# Patient Record
Sex: MALE | Race: WHITE | NOT HISPANIC OR LATINO | Employment: FULL TIME | URBAN - METROPOLITAN AREA
[De-identification: names, ages, dates, MRNs, and addresses within clinical notes are randomized per-mention and may not be internally consistent; named-entity substitution may affect disease eponyms.]

---

## 2020-07-07 ENCOUNTER — OFFICE VISIT (OUTPATIENT)
Dept: URGENT CARE | Facility: CLINIC | Age: 40
End: 2020-07-07
Payer: COMMERCIAL

## 2020-07-07 VITALS
TEMPERATURE: 98 F | DIASTOLIC BLOOD PRESSURE: 75 MMHG | WEIGHT: 190 LBS | BODY MASS INDEX: 29.82 KG/M2 | HEIGHT: 67 IN | RESPIRATION RATE: 18 BRPM | HEART RATE: 92 BPM | SYSTOLIC BLOOD PRESSURE: 141 MMHG

## 2020-07-07 DIAGNOSIS — S61.432A PUNCTURE WOUND OF LEFT HAND, FOREIGN BODY PRESENCE UNSPECIFIED, INITIAL ENCOUNTER: ICD-10-CM
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
## 2020-07-07 PROCEDURE — 73130 XR HAND COMPLETE 3 VIEW LEFT: ICD-10-PCS | Mod: LT,S$GLB,, | Performed by: RADIOLOGY

## 2020-07-07 PROCEDURE — 99203 PR OFFICE/OUTPT VISIT, NEW, LEVL III, 30-44 MIN: ICD-10-PCS | Mod: S$GLB,,, | Performed by: NURSE PRACTITIONER

## 2020-07-07 PROCEDURE — 99203 OFFICE O/P NEW LOW 30 MIN: CPT | Mod: S$GLB,,, | Performed by: NURSE PRACTITIONER

## 2020-07-07 PROCEDURE — 73130 X-RAY EXAM OF HAND: CPT | Mod: LT,S$GLB,, | Performed by: RADIOLOGY

## 2020-07-07 RX ORDER — IBUPROFEN 200 MG
600 TABLET ORAL
Status: COMPLETED | OUTPATIENT
Start: 2020-07-07 | End: 2020-07-07

## 2020-07-07 RX ORDER — CEPHALEXIN 500 MG/1
500 CAPSULE ORAL EVERY 8 HOURS
Qty: 15 CAPSULE | Refills: 0 | Status: SHIPPED | OUTPATIENT
Start: 2020-07-07 | End: 2020-07-12

## 2020-07-07 RX ORDER — MUPIROCIN 20 MG/G
OINTMENT TOPICAL
Qty: 22 G | Refills: 0 | OUTPATIENT
Start: 2020-07-07 | End: 2023-03-22

## 2020-07-07 RX ADMIN — Medication 600 MG: at 03:07

## 2020-07-07 NOTE — PATIENT INSTRUCTIONS
Puncture Wound       A puncture wound occurs when a pointed object pushes into the skin. It may go into the tissues below the skin, including fat and muscle. This type of wound is narrow and deep and can be hard to clean. Because of this, puncture wounds are at high risk for becoming infected.  X-rays may be done to check the wound for objects stuck under the skin. Your may also need a tetanus shot. This is given if you are not up to date on this vaccination and the object that caused the wound may lead to tetanus.  Home care  · Your healthcare provider may prescribe an antibiotic. This is to help prevent infection. Follow all instructions for taking this medicine. Take the medicine every day until it is gone or you are told to stop. You should not have any left over.  · The healthcare provider may prescribe medicines for pain. Follow instructions for taking them.  · You can take acetaminophen or ibuprofen for pain, unless you were given a different pain medicine to use.   · Follow the healthcare providers instructions on how to care for the wound.  · Keep the wound clean and dry. Do not get the wound wet until you are told it is okay to do so. If the area gets wet, gently pat it dry with a clean cloth. Replace the wet bandage with a dry one.  · If a bandage was applied and it becomes wet or dirty, replace it. Otherwise, leave it in place for the first 24 hours.  · Once you can get the wound wet, you may shower as usual but do not soak the wound in water (no tub baths or swimming)  · Even with proper treatment, a puncture wound may become infected. Check the wound daily for signs of infection listed below.  Follow-up care  Follow up with your healthcare provider as advised.   When to seek medical advice  Call your healthcare provider right away if any of these occur:  · Signs of infection, including:  ¨ Increasing redness or swelling around the wound  ¨ Increased warmth of the wound  ¨ Worsening pain  ¨ Red  streaking lines away from the wound  ¨ Draining pus  · Fever of 100.4°F (38.ºC) or higher or as directed by your healthcare provider  · Wound changes colors  · Numbness around the wound  · Decreased movement around the injured area  Date Last Reviewed: 8/24/2015  © 5693-6431 [employer]. [address] [place] [address], [place] [address]. All rights reserved. This information is not intended as a substitute for professional medical care. Always follow your healthcare professional's instructions.

## 2020-07-07 NOTE — LETTER
[facility] [address] [address]  Phone: [phone]  Fax: [phone]  [facility]: [id number]    Pt Name: Jayden Pepper  Injury Date: 07/07/2020    Date of First Treatment: 07/07/2020   Company: [employer]      Appointment Time: 01:45 PM Arrived: 145pm   Provider: Priya Ellington NP Time Out:320pm     Office Treatment:   1. Work related injury    2. Puncture wound of left hand, foreign body presence unspecified, initial encounter      Medications Ordered This Encounter   Medications    cephALEXin (KEFLEX) 500 MG capsule    diptheria-tetanus toxoids 2-2 Lf unit/0.5 mL injection 0.5 mL    ibuprofen tablet 600 mg    mupirocin (BACTROBAN) 2 % ointment      Patient Instructions: Elevated affected area, Apply ice 24-48 hours then apply heat/warm soaks, Keep dressing clean/dry/covered(Please take Tylenol or ibuprofen as directed for pain and discomfort.  Please use Bactroban as directed.)    Restrictions: Regular Duty     Return Appointment: 07/14/2020 at 1030am

## 2020-07-07 NOTE — LETTER
[facility] [address] [address]  Phone: [phone]  Fax: [phone]  [facility]: [id number]    Pt Name: Jayden Pepper  Injury Date: 07/07/2020    Date of First Treatment: 07/07/2020   Company: [employer]      Appointment Time: 01:45 PM Arrived: 145pm   Provider: Priya Ellington NP Time Out:320pm     Office Treatment:   1. Work related injury    2. Puncture wound of left hand, foreign body presence unspecified, initial encounter      Medications Ordered This Encounter   Medications    cephALEXin (KEFLEX) 500 MG capsule    diptheria-tetanus toxoids 2-2 Lf unit/0.5 mL injection 0.5 mL    ibuprofen tablet 600 mg    mupirocin (BACTROBAN) 2 % ointment      Patient Instructions: Elevated affected area, Apply ice 24-48 hours then apply heat/warm soaks, Keep dressing clean/dry/covered(Please take Tylenol or ibuprofen as directed for pain and discomfort.  Please use Bactroban as directed.)    Restrictions: Regular Duty     Return Appointment: 07/14/2020 at 1030am

## 2020-07-07 NOTE — PROGRESS NOTES
Subjective:       Patient ID: Jayden Pepper is a 40 y.o. male.    Chief Complaint: Puncture Wound    Pt was pushing nails into a wall and forced his left palm into a dirty nail.   Nail punctured palm below left middle finger.  This occurred at work at 12:30 PM.  Pt presents with swelling to palm/index/middle/ring finger and reports significant pain.  Does not think his tetanus is up-to-date.  He is left-hand dominant.    Other  This is a new problem. The current episode started today. The problem occurs constantly. The problem has been gradually worsening. Associated symptoms include arthralgias and joint swelling. Pertinent negatives include no abdominal pain, anorexia, change in bowel habit, chest pain, chills, congestion, coughing, diaphoresis, fatigue, fever, headaches, myalgias, nausea, neck pain, numbness, rash, sore throat, swollen glands, urinary symptoms, vertigo, visual change, vomiting or weakness. The symptoms are aggravated by bending (palpation). He has tried nothing for the symptoms. The treatment provided no relief.       Constitution: Negative for chills, sweating, fatigue and fever.   HENT: Negative for congestion and sore throat.    Neck: Negative for neck pain.   Cardiovascular: Negative for chest pain.   Respiratory: Negative for cough.    Gastrointestinal: Negative for abdominal pain, nausea and vomiting.   Musculoskeletal: Positive for trauma, joint pain and joint swelling. Negative for muscle ache.   Skin: Positive for puncture wound. Negative for rash and erythema.   Neurological: Negative for history of vertigo, headaches and numbness.        Objective:      Physical Exam  Vitals signs and nursing note reviewed.   Constitutional:       Appearance: Normal appearance. He is well-developed. He is not ill-appearing, toxic-appearing or diaphoretic.   HENT:      Head: Normocephalic and atraumatic. No abrasion, contusion or laceration.      Right Ear: External ear normal.      Left Ear:  External ear normal.      Nose: Nose normal.   Eyes:      General: Lids are normal.      Conjunctiva/sclera: Conjunctivae normal.      Pupils: Pupils are equal, round, and reactive to light.   Neck:      Musculoskeletal: Full passive range of motion without pain and neck supple.      Trachea: Trachea and phonation normal.   Cardiovascular:      Rate and Rhythm: Normal rate and regular rhythm.      Pulses:           Radial pulses are 1+ on the right side and 2+ on the left side.      Heart sounds: Normal heart sounds.   Pulmonary:      Effort: Pulmonary effort is normal. No respiratory distress.      Breath sounds: Normal breath sounds. No stridor.   Musculoskeletal: Normal range of motion.        Hands:    Skin:     General: Skin is warm and dry.      Capillary Refill: Capillary refill takes less than 2 seconds.      Findings: No abrasion, bruising, burn, ecchymosis, erythema, laceration, lesion or rash.      Comments: Puncture wound was soaked and cleaned in Hibiclens.  Bactroban and a Band-Aid was then applied.   Neurological:      Mental Status: He is alert and oriented to person, place, and time.   Psychiatric:         Speech: Speech normal.         Behavior: Behavior normal. Behavior is cooperative.         Thought Content: Thought content normal.         Judgment: Judgment normal.       Xr Hand Complete 3 View Left    Result Date: 7/7/2020  EXAMINATION: XR HAND COMPLETE 3 VIEW LEFT CLINICAL HISTORY: Civilian activity done for income or pay FINDINGS: Three views left hand: No fracture dislocation bone destruction seen. Electronically signed by: Scout Reynoso MD Date:    07/07/2020 Time:    15:10  Assessment:       1. Work related injury    2. Puncture wound of left hand, foreign body presence unspecified, initial encounter        Plan:         Medications Ordered This Encounter   Medications    cephALEXin (KEFLEX) 500 MG capsule     Sig: Take 1 capsule (500 mg total) by mouth every 8 (eight) hours. for 5 days      Dispense:  15 capsule     Refill:  0    diptheria-tetanus toxoids 2-2 Lf unit/0.5 mL injection 0.5 mL    ibuprofen tablet 600 mg    mupirocin (BACTROBAN) 2 % ointment     Sig: Apply to affected area 3 times daily     Dispense:  22 g     Refill:  0     Patient Instructions: Elevated affected area, Apply ice 24-48 hours then apply heat/warm soaks, Keep dressing clean/dry/covered(Please take Tylenol or ibuprofen as directed for pain and discomfort.  Please use Bactroban as directed.)   Restrictions: Regular Duty  Follow up in about 1 week (around 7/14/2020).

## 2020-07-07 NOTE — LETTER
[facility] [address] [address]  Phone: [phone]  Fax: [phone]  [facility]: [id number]    Pt Name: Jayden Pepper  Injury Date: 07/07/2020    Date of First Treatment: 07/07/2020   Company: [employer]      Appointment Time: 01:45 PM Arrived: 2pm   Provider: Priya Ellington NP Time Out:320pm     Office Treatment:   1. Work related injury    2. Puncture wound of left hand, foreign body presence unspecified, initial encounter      Medications Ordered This Encounter   Medications    cephALEXin (KEFLEX) 500 MG capsule    diptheria-tetanus toxoids 2-2 Lf unit/0.5 mL injection 0.5 mL    ibuprofen tablet 600 mg    mupirocin (BACTROBAN) 2 % ointment                 Return Appointment: 07/14/2020 at 1030am

## 2023-03-22 ENCOUNTER — HOSPITAL ENCOUNTER (EMERGENCY)
Facility: HOSPITAL | Age: 43
Discharge: HOME | End: 2023-03-22
Attending: EMERGENCY MEDICINE
Payer: COMMERCIAL

## 2023-03-22 VITALS
TEMPERATURE: 98 F | OXYGEN SATURATION: 96 % | SYSTOLIC BLOOD PRESSURE: 125 MMHG | RESPIRATION RATE: 18 BRPM | BODY MASS INDEX: 29.03 KG/M2 | HEIGHT: 67 IN | DIASTOLIC BLOOD PRESSURE: 81 MMHG | HEART RATE: 85 BPM | WEIGHT: 185 LBS

## 2023-03-22 DIAGNOSIS — R04.0 EPISTAXIS: Primary | ICD-10-CM

## 2023-03-22 LAB
HCT, POC: NORMAL
HGB, POC: NORMAL (ref 14–18)
MCH, POC: NORMAL
MCHC, POC: NORMAL
MCV, POC: NORMAL
MPV, POC: NORMAL
POC PLATELET COUNT: NORMAL
RBC, POC: NORMAL
RDW, POC: NORMAL
WBC, POC: NORMAL

## 2023-03-22 PROCEDURE — 85025 COMPLETE CBC W/AUTO DIFF WBC: CPT | Mod: ER

## 2023-03-22 PROCEDURE — 25000003 PHARM REV CODE 250: Mod: ER | Performed by: NURSE PRACTITIONER

## 2023-03-22 PROCEDURE — 99281 EMR DPT VST MAYX REQ PHY/QHP: CPT | Mod: ER

## 2023-03-22 RX ORDER — CEPHALEXIN 500 MG/1
CAPSULE ORAL
COMMUNITY
End: 2024-02-05

## 2023-03-22 RX ORDER — QUETIAPINE FUMARATE 100 MG/1
TABLET, FILM COATED ORAL
COMMUNITY
End: 2024-02-05

## 2023-03-22 RX ORDER — GABAPENTIN 600 MG/1
TABLET ORAL
COMMUNITY
End: 2024-02-05

## 2023-03-22 RX ORDER — TRAZODONE HYDROCHLORIDE 100 MG/1
TABLET ORAL
COMMUNITY
End: 2024-02-05

## 2023-03-22 RX ORDER — BUPRENORPHINE AND NALOXONE 8; 2 MG/1; MG/1
FILM, SOLUBLE BUCCAL; SUBLINGUAL
COMMUNITY
End: 2024-02-05 | Stop reason: ALTCHOICE

## 2023-03-22 RX ORDER — AZELASTINE 1 MG/ML
SPRAY, METERED NASAL
COMMUNITY
Start: 2022-12-19 | End: 2024-02-05

## 2023-03-22 RX ORDER — BROMPHENIRAMINE MALEATE, PSEUDOEPHEDRINE HYDROCHLORIDE, AND DEXTROMETHORPHAN HYDROBROMIDE 2; 30; 10 MG/5ML; MG/5ML; MG/5ML
10 SYRUP ORAL EVERY 6 HOURS PRN
COMMUNITY
Start: 2022-12-22 | End: 2024-02-05

## 2023-03-22 RX ORDER — BUPROPION HYDROCHLORIDE 150 MG/1
TABLET ORAL
COMMUNITY
End: 2024-02-05

## 2023-03-22 RX ORDER — AMOXICILLIN AND CLAVULANATE POTASSIUM 875; 125 MG/1; MG/1
1 TABLET, FILM COATED ORAL 2 TIMES DAILY
COMMUNITY
Start: 2022-12-19 | End: 2024-02-05 | Stop reason: ALTCHOICE

## 2023-03-22 RX ORDER — QUETIAPINE FUMARATE 50 MG/1
TABLET, FILM COATED ORAL
COMMUNITY
End: 2024-02-05

## 2023-03-22 RX ORDER — ETODOLAC 500 MG/1
TABLET, FILM COATED ORAL
COMMUNITY
End: 2024-02-05

## 2023-03-22 RX ORDER — GABAPENTIN 300 MG/1
CAPSULE ORAL
COMMUNITY
End: 2024-02-05

## 2023-03-22 RX ORDER — CLONIDINE HYDROCHLORIDE 0.1 MG/1
TABLET ORAL
COMMUNITY
End: 2024-02-05

## 2023-03-22 RX ORDER — METHYLPREDNISOLONE 4 MG/1
TABLET ORAL
COMMUNITY
End: 2024-02-05 | Stop reason: ALTCHOICE

## 2023-03-22 RX ORDER — BACITRACIN 500 [USP'U]/G
OINTMENT TOPICAL
Status: DISCONTINUED | OUTPATIENT
Start: 2023-03-22 | End: 2023-03-22 | Stop reason: HOSPADM

## 2023-03-22 RX ORDER — CLONAZEPAM 1 MG/1
TABLET ORAL
COMMUNITY
End: 2024-02-05 | Stop reason: ALTCHOICE

## 2023-03-22 RX ORDER — ALBUTEROL SULFATE 90 UG/1
2 AEROSOL, METERED RESPIRATORY (INHALATION)
COMMUNITY
Start: 2022-12-19

## 2023-03-22 RX ORDER — MELOXICAM 15 MG/1
TABLET ORAL
COMMUNITY
End: 2024-02-05

## 2023-03-22 RX ADMIN — PHENYLEPHRINE HYDROCHLORIDE 1 SPRAY: 0.5 SPRAY NASAL at 08:03

## 2023-03-23 NOTE — ED PROVIDER NOTES
Encounter Date: 3/22/2023    SCRIBE #1 NOTE: I, Virgie Trotter, am scribing for, and in the presence of,  Chris Ku DNP. I have scribed the following portions of the note - Other sections scribed: HPI, ROS, PE.     History     Chief Complaint   Patient presents with    Epistaxis     Pt bleeding from r nostril x 1hr; denies injury/trauma and not on anticoagulants     42 year old male with no pertinent PMHx presents to the ER for a constant nose bleed beginning 1.5 hours ago.  Pt denies having frequent nose bleeds. He also denies having a similar episode in the past. Pt denies recent injury, head trauma, or nasal picking. Denies sneezing, coughing, and pain.  He does endorse using cocaine under a week ago.    The history is provided by the patient. No  was used.   Review of patient's allergies indicates:  No Known Allergies  History reviewed. No pertinent past medical history.  Past Surgical History:   Procedure Laterality Date    WRIST SURGERY Right      History reviewed. No pertinent family history.  Social History     Tobacco Use    Smoking status: Every Day    Smokeless tobacco: Never   Substance Use Topics    Alcohol use: Yes    Drug use: Never     Review of Systems   Constitutional:  Negative for appetite change, chills, diaphoresis, fatigue and fever.   HENT:  Positive for nosebleeds. Negative for congestion, ear discharge, ear pain, postnasal drip, rhinorrhea, sinus pressure, sneezing, sore throat and voice change.    Eyes:  Negative for discharge, itching and visual disturbance.   Respiratory:  Negative for cough, shortness of breath and wheezing.    Cardiovascular:  Negative for chest pain, palpitations and leg swelling.   Gastrointestinal:  Negative for abdominal pain, nausea and vomiting.   Endocrine: Negative for polydipsia, polyphagia and polyuria.   Genitourinary:  Negative for difficulty urinating, dysuria, frequency, hematuria, penile discharge, penile pain, penile  swelling and urgency.   Musculoskeletal:  Negative for arthralgias and myalgias.   Skin:  Negative for rash and wound.   Neurological:  Negative for dizziness, seizures, syncope and weakness.   Hematological:  Negative for adenopathy. Does not bruise/bleed easily.   Psychiatric/Behavioral:  Negative for agitation and self-injury. The patient is not nervous/anxious.      Physical Exam     Initial Vitals [03/22/23 1911]   BP Pulse Resp Temp SpO2   125/81 85 18 98.2 °F (36.8 °C) 96 %      MAP       --         Physical Exam    Nursing note and vitals reviewed.  Constitutional: He appears well-developed and well-nourished. He is not diaphoretic. No distress.   HENT:   Head: Normocephalic and atraumatic.   Right Ear: Hearing, tympanic membrane, external ear and ear canal normal.   Left Ear: Hearing, tympanic membrane, external ear and ear canal normal.   Nose: No mucosal edema or rhinorrhea. Epistaxis (From the right near.  Also draining down the posterior oropharynx) is observed. Right sinus exhibits no maxillary sinus tenderness and no frontal sinus tenderness. Left sinus exhibits no maxillary sinus tenderness and no frontal sinus tenderness.   Mouth/Throat: Uvula is midline, oropharynx is clear and moist and mucous membranes are normal. No oral lesions. Normal dentition.   Eyes: Pupils are equal, round, and reactive to light. Right eye exhibits no discharge. Left eye exhibits no discharge. No scleral icterus.   Neck:   Normal range of motion.  Pulmonary/Chest: No respiratory distress.   Abdominal: He exhibits no distension.   Musculoskeletal:         General: Normal range of motion.      Cervical back: Normal range of motion.     Neurological: He is alert and oriented to person, place, and time.   Skin: Skin is dry. Capillary refill takes less than 2 seconds.       ED Course   Procedures  Labs Reviewed   POCT CBC          Imaging Results    None          Medications   bacitracin ointment (has no administration in time  range)   phenylephrine HCL 0.5% nasal spray 1 spray (1 spray Each Nostril Given 3/22/23 2048)           APC / Resident Notes:   MEDICAL DECISION MAKING    Initial Assessment:  42-year-old male with a history of recent cocaine use presents with right nasal epistaxis.  On examination I can not determine the site of bleeding.  There was blood draining down the posterior oropharynx as well.  He was tolerating secretions and his airway was patent.  He was in no apparent distress.  He denied a history of epistaxis digitorum and trauma.    Differential Diagnosis:  Bleeding disorder, epistaxis digitorum, nasal erosion secondary to drug use, idiopathic epistaxis    See ED Course Below for Interpretation and Time Stamped Events    Discharge Planning:  Following an extended period of pressure after the administration of Afrin the bleeding stopped.  The patient was held for a period of observation and a CBC was ran indicating no anemia.  He was discharged home in good condition to follow up and to use Afrin for repetitive bleeding with extended periods of pressure.  He was given a referral for Ear Nose and Throat.     Scribe Attestation:   Scribe #1: I performed the above scribed service and the documentation accurately describes the services I performed. I attest to the accuracy of the note.      ED Course as of 03/22/23 2142   Wed Mar 22, 2023   2005 BP: 125/81 [VC]   2005 Temp: 98.2 °F (36.8 °C) [VC]   2005 Temp Source: Oral [VC]   2005 Pulse: 85 [VC]   2005 Resp: 18 [VC]   2005 SpO2: 96 % [VC]      ED Course User Index  [VC] HALEY Galdamez attestation: Scribe attestation: I, Chris Ku DNP ACNP-BC FNP-C ENP-C,  personally performed the services described in this documentation. All medical record entries made by the scribe were at my direction and in my presence.  I have reviewed the chart and agree that the record reflects my personal performance and is accurate and complete.   "  Clinical Impression:   Final diagnoses:  [R04.0] Epistaxis (Primary)        ED Disposition Condition    Discharge Stable          ED Prescriptions    None       Follow-up Information       Follow up With Specialties Details Why Contact Info    Alaina Stevenson MD Otolaryngology Schedule an appointment as soon as possible for a visit   [address] [place] [address]  [phone]            Vital signs at the time of disposition were:  /81 (BP Location: Right arm, Patient Position: Sitting)   Pulse 85   Temp 98.2 °F (36.8 °C) (Oral)   Resp 18   Ht 5' 7" (1.702 m)   Wt 83.9 kg (185 lb)   SpO2 96%   BMI 28.98 kg/m²       See AVS for additional recommendations. Medications listed herein were prescribed after reviewing the patient's allergies, medication list, history, most recent laboratories as available.  Referrals below were provided after reviewing the patient's previous medical providers. He understands he  should return for any worsening or changes in condition.  Prior to discharge the patient was asked if he  had any additional concerns or complaints and he declined. The patient was given an opportunity to ask questions and all were answered to his satisfaction.      Chris Ku, HALEY  03/22/23 2142    "

## 2023-03-23 NOTE — ED TRIAGE NOTES
PT C/O ONGOING EPISTAXIS OF R NARE THAT STARTED W/O [employer]OV[employer]ATION PTA. DENIES USE OF ANTICOAGULANTS OR ANTIPLATELETS

## 2023-03-23 NOTE — DISCHARGE INSTRUCTIONS
If nosebleed recurs, give a spray of afrin after blowing nose out and hold pressure for 10min as instructed.  If bleeding persists, hold for 20min.  If bleeding persists come to ER.      Return to the Emergency Department for any worsening, change in condition, or any emergent concerns.

## 2023-08-01 ENCOUNTER — TELEPHONE (OUTPATIENT)
Dept: ORTHOPEDICS | Facility: CLINIC | Age: 43
End: 2023-08-01
Payer: COMMERCIAL

## 2023-08-01 NOTE — TELEPHONE ENCOUNTER
----- Message from Tracy Garcia sent at 8/1/2023 10:40 AM CDT -----  Regarding: appt  Pt spouse calling in regards to pt falling off of a dump truck and hurt his right leg , leg is swollen and hot and leg is yellow and purple     Confirmed patient's contact info below:  Contact Name: Jayden Pepper  Phone Number: [phone]     Spoke with Mrs. His symptoms have escalated since the injury to include disorientation ,forgetfullness    Informed he needs to go to the ER

## 2024-02-05 ENCOUNTER — OFFICE VISIT (OUTPATIENT)
Dept: URGENT CARE | Facility: CLINIC | Age: 44
End: 2024-02-05
Payer: COMMERCIAL

## 2024-02-05 VITALS
DIASTOLIC BLOOD PRESSURE: 92 MMHG | HEART RATE: 73 BPM | SYSTOLIC BLOOD PRESSURE: 148 MMHG | TEMPERATURE: 98 F | WEIGHT: 185 LBS | RESPIRATION RATE: 20 BRPM | OXYGEN SATURATION: 95 % | BODY MASS INDEX: 29.03 KG/M2 | HEIGHT: 67 IN

## 2024-02-05 DIAGNOSIS — M79.672 PAIN IN BOTH FEET: Primary | ICD-10-CM

## 2024-02-05 DIAGNOSIS — M79.671 PAIN IN BOTH FEET: Primary | ICD-10-CM

## 2024-02-05 LAB — GLUCOSE SERPL-MCNC: 106 MG/DL (ref 70–110)

## 2024-02-05 PROCEDURE — 99204 OFFICE O/P NEW MOD 45 MIN: CPT | Mod: S$GLB,,, | Performed by: FAMILY MEDICINE

## 2024-02-05 PROCEDURE — 82962 GLUCOSE BLOOD TEST: CPT | Mod: S$GLB,,, | Performed by: FAMILY MEDICINE

## 2024-02-05 NOTE — PROGRESS NOTES
"Subjective:      Patient ID: Jayden Pepper is a 43 y.o. male.    Vitals:  height is 5' 7" (1.702 m) and weight is 83.9 kg (185 lb). His oral temperature is 98.1 °F (36.7 °C). His blood pressure is 148/92 (abnormal) and his pulse is 73. His respiration is 20 and oxygen saturation is 95%.     Chief Complaint: Leg Pain    43-year-old male who 2 weeks ago began noticing some pain in his left foot.  It at times radiates up his leg near his knee area.  This morning he noticed pain to his right foot beginning.  No recent injury.  He describes himself as very active, but no overuse beyond his usual.  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  His boots look very worn, and he reports having them about 6 months.  No fever or systemic symptoms.  He is worried he might be a diabetic or have gout          Leg Pain   The incident occurred more than 1 week ago. There was no injury mechanism. The pain is present in the left leg, left foot, left knee, right foot and right knee. The quality of the pain is described as aching. The pain is at a severity of 6/10. The pain is moderate. The pain has been Constant since onset. Associated symptoms include an inability to bear weight. Pertinent negatives include no loss of motion, loss of sensation, muscle weakness, numbness or tingling. He reports no foreign bodies present. The symptoms are aggravated by movement and weight bearing. He has tried nothing for the symptoms.       Neurological:  Negative for numbness.      Objective:     Physical Exam   Constitutional: He is oriented to person, place, and time. He appears well-developed.  Non-toxic appearance. He does not appear ill. No distress.   HENT:   Head: Normocephalic and atraumatic.   Ears:   Right Ear: External ear normal.   Left Ear: External ear normal.   Cardiovascular: Normal rate and regular rhythm.   Pulmonary/Chest: Effort normal. No stridor. No respiratory distress. He has no wheezes. He has no rhonchi. He has no " rales.   Musculoskeletal:      Comments: Pes planus noted bilaterally.  No point tenderness elicited either foot.  No swelling appreciated.  No redness.  He ambulates with some caution, due to discomfort, but able to toe-walk and heel-walk.     Quite noticeable are degenerative changes with deformity, to both hands.  Presumably degenerative changes to feet as well   Neurological: He is alert and oriented to person, place, and time.   Skin: Skin is not diaphoretic.   Psychiatric: His behavior is normal. Thought content normal.   Nursing note and vitals reviewed.    Results for orders placed or performed in visit on 02/05/24   POCT Glucose, Hand-Held Device   Result Value Ref Range    POC Glucose 106 70 - 110 MG/DL      Assessment:     1. Pain in both feet        Plan:       Pain in both feet  -     POCT Glucose, Hand-Held Device  -     Ambulatory referral/consult to Podiatry    AS WE DISCUSSED, I THINK YOUR PAIN IS MORE LIKELY RELATED TO LOW ARCHES, COMBINED WITH ARTHRITIS CHANGES, COMBINED WITH SUB OPTIMAL SUPPORT IN YOUR SHOES.    YOU CAN USE IBUPROFEN 600 MG EVERY 6-8 HOURS.    YOU MIGHT ALSO TRY TO FIND SOME SHOES WITH BETTER SUPPORT.    I SUBMITTED A REFERRAL FOR YOU TO SEE PODIATRY FOR FURTHER EVALUATION AND TREATMENT.  YOU CAN ALSO CALL [phone] TO HELP FACILITATE THIS APPOINTMENT.    Make sure that you follow up with your primary care doctor in the next 2-5 days if needed .  Go to or return to Urgent Care if signs or symptoms change and certainly if you have worsening and/or severe symptoms go to the nearest emergency department for further evaluation.

## 2024-02-06 NOTE — PATIENT INSTRUCTIONS
AS WE DISCUSSED, I THINK YOUR PAIN IS MORE LIKELY RELATED TO LOW ARCHES, COMBINED WITH ARTHRITIS CHANGES, COMBINED WITH SUB OPTIMAL SUPPORT IN YOUR SHOES.    YOU CAN USE IBUPROFEN 600 MG EVERY 6-8 HOURS.    YOU MIGHT ALSO TRY TO FIND SOME SHOES WITH BETTER SUPPORT.    I SUBMITTED A REFERRAL FOR YOU TO SEE PODIATRY FOR FURTHER EVALUATION AND TREATMENT.  YOU CAN ALSO CALL [phone] TO HELP FACILITATE THIS APPOINTMENT.    Make sure that you follow up with your primary care doctor in the next 2-5 days if needed .  Go to or return to Urgent Care if signs or symptoms change and certainly if you have worsening and/or severe symptoms go to the nearest emergency department for further evaluation.

## 2024-02-08 ENCOUNTER — TELEPHONE (OUTPATIENT)
Dept: PODIATRY | Facility: CLINIC | Age: 44
End: 2024-02-08
Payer: COMMERCIAL

## 2024-02-08 NOTE — TELEPHONE ENCOUNTER
Spoke with patient spouse and his appointment on 02/12/2024 with Dr. Taylor(Podiatry) has been confirmed

## 2024-10-14 ENCOUNTER — OFFICE VISIT (OUTPATIENT)
Dept: URGENT CARE | Facility: CLINIC | Age: 44
End: 2024-10-14
Payer: COMMERCIAL

## 2024-10-14 VITALS
SYSTOLIC BLOOD PRESSURE: 141 MMHG | WEIGHT: 185 LBS | OXYGEN SATURATION: 96 % | RESPIRATION RATE: 18 BRPM | HEIGHT: 67 IN | HEART RATE: 75 BPM | TEMPERATURE: 98 F | BODY MASS INDEX: 29.03 KG/M2 | DIASTOLIC BLOOD PRESSURE: 83 MMHG

## 2024-10-14 DIAGNOSIS — B96.89 BACTERIAL CONJUNCTIVITIS OF BOTH EYES: Primary | ICD-10-CM

## 2024-10-14 DIAGNOSIS — H10.9 BACTERIAL CONJUNCTIVITIS OF BOTH EYES: Primary | ICD-10-CM

## 2024-10-14 RX ORDER — MOXIFLOXACIN 5 MG/ML
1 SOLUTION/ DROPS OPHTHALMIC 3 TIMES DAILY
Qty: 3 ML | Refills: 0 | Status: SHIPPED | OUTPATIENT
Start: 2024-10-14

## 2024-10-14 NOTE — PATIENT INSTRUCTIONS
Most cases of pink eye go away on their own without treatment. But some types of pink eye can be treated.  When pink eye is caused by infection, it is usually caused by a virus, so antibiotics will not help. Still, pink eye caused by a virus can last several days.  Pink eye caused by an infection with bacteria can be treated with antibiotic eye drops, gel, or ointment.  Pink eye caused by other problems can be treated with eye drops normally used to treat allergies. These drops will not cure the pink eye, but they can help with itchiness and irritation.  When using eye drops for infection, do not touch your healthy eye after touching your infected eye. Also, do not touch the bottle or dropper directly onto 1 eye and then use it in the other. These things can cause the infection to spread from 1 eye to the other.  If your eyelids feel swollen, it might also help to hold a cool wet cloth on the area.    Can pink eye be prevented?  To keep from getting or spreading pink eye caused by an infection:  Wash your hands often with soap and water.  Try not to touch your eyes.  Avoid sharing towels, bedding, or other personal items with a person who has pink eye.  If your pink eye is caused by allergies, it might help to stay inside with the windows shut as much as possible during peak allergy seasons.      Common side effects include stinging and burning upon instillation. Patients may find it helpful to refrigerate the drops and/or use refrigerated artificial tears before using these medications. Other adverse effects include headache and increased ocular dryness.       Please remember that you have received care at an urgent care today. Urgent cares are not emergency rooms and are not equipped to handle life threatening emergencies and cannot rule in or out certain medical conditions and you may be released before all of your medical problems are known or treated.     Please arrange follow up with your primary care  physician or speciality clinic within 2-5 days if your signs and symptoms have not resolved or worsen.     Patient can call our Referral Hotline at [phone] to make an appointment.      Please return here or go to the Emergency Department for any concerns or worsening of condition.   (Worsening vision, eye pain, sensitivity to light, increased eye discharge)

## 2024-10-14 NOTE — PROGRESS NOTES
"Subjective:      Patient ID: Jayden Pepper is a 44 y.o. male.    Vitals:  height is 5' 7" (1.702 m) and weight is 83.9 kg (185 lb). His tympanic temperature is 97.6 °F (36.4 °C). His blood pressure is 141/83 (abnormal) and his pulse is 75. His respiration is 18 and oxygen saturation is 96%.     Chief Complaint: Eye Problem    Pt states that he is having both eye irritation that started on 10/13. Pt states that both eye have discharge and he was exposed to pink eye last week .      Provider note begins here:  Patient is a 44 year old male with complaints of pink eye. States he noticed some irritation to right ear 2 days ago. Yesterday he noticed some discharge. This morning both eyes were crusted when he woke up. Two daughters with pink eye last week that improved with antibiotic eye drops. Pt denies any URI symptoms.     Eye Problem   Both eyes are affected. This is a new problem. The current episode started yesterday. The problem occurs constantly. The problem has been unchanged. There was no injury mechanism. The pain is at a severity of 0/10. The patient is experiencing no pain. There is Known exposure to pink eye. Associated symptoms include an eye discharge, eye redness and photophobia. Pertinent negatives include no blurred vision, double vision, fever, foreign body sensation, itching, nausea, recent URI or vomiting. He has tried commercial eye wash for the symptoms. The treatment provided no relief.       Constitution: Negative for fever.   Eyes:  Positive for eye discharge, eye redness and photophobia. Negative for eye itching, double vision and blurred vision.   Gastrointestinal:  Negative for nausea and vomiting.      Objective:     Physical Exam   Constitutional: He is oriented to person, place, and time. He appears well-developed.   HENT:   Head: Normocephalic and atraumatic.   Ears:   Right Ear: External ear normal.   Left Ear: External ear normal.   Nose: Nose normal.   Mouth/Throat: " Oropharynx is clear and moist.   Eyes: EOM and lids are normal. Pupils are equal, round, and reactive to light. Right eye exhibits discharge and exudate. Right eye exhibits no chemosis and no hordeolum. No foreign body present in the right eye. Left eye exhibits discharge and exudate. Left eye exhibits no chemosis and no hordeolum. No foreign body present in the left eye. Right conjunctiva is injected. Right conjunctiva has no hemorrhage. Left conjunctiva is injected. Left conjunctiva has no hemorrhage. Right eye exhibits normal extraocular motion and no nystagmus. Left eye exhibits normal extraocular motion and no nystagmus.   Neck: Trachea normal and phonation normal. Neck supple.   Musculoskeletal: Normal range of motion.         General: Normal range of motion.   Neurological: He is alert and oriented to person, place, and time.   Skin: Skin is warm, dry and intact.   Psychiatric: His speech is normal and behavior is normal. Judgment and thought content normal.   Nursing note and vitals reviewed.      Assessment:     1. Bacterial conjunctivitis of both eyes        Plan:       Bacterial conjunctivitis of both eyes  -     moxifloxacin (VIGAMOX) 0.5 % ophthalmic solution; Place 1 drop into both eyes 3 (three) times daily.  Dispense: 3 mL; Refill: 0             Patient Instructions   Most cases of pink eye go away on their own without treatment. But some types of pink eye can be treated.  When pink eye is caused by infection, it is usually caused by a virus, so antibiotics will not help. Still, pink eye caused by a virus can last several days.  Pink eye caused by an infection with bacteria can be treated with antibiotic eye drops, gel, or ointment.  Pink eye caused by other problems can be treated with eye drops normally used to treat allergies. These drops will not cure the pink eye, but they can help with itchiness and irritation.  When using eye drops for infection, do not touch your healthy eye after touching  your infected eye. Also, do not touch the bottle or dropper directly onto 1 eye and then use it in the other. These things can cause the infection to spread from 1 eye to the other.  If your eyelids feel swollen, it might also help to hold a cool wet cloth on the area.    Can pink eye be prevented?  To keep from getting or spreading pink eye caused by an infection:  Wash your hands often with soap and water.  Try not to touch your eyes.  Avoid sharing towels, bedding, or other personal items with a person who has pink eye.  If your pink eye is caused by allergies, it might help to stay inside with the windows shut as much as possible during peak allergy seasons.      Common side effects include stinging and burning upon instillation. Patients may find it helpful to refrigerate the drops and/or use refrigerated artificial tears before using these medications. Other adverse effects include headache and increased ocular dryness.       Please remember that you have received care at an urgent care today. Urgent cares are not emergency rooms and are not equipped to handle life threatening emergencies and cannot rule in or out certain medical conditions and you may be released before all of your medical problems are known or treated.     Please arrange follow up with your primary care physician or speciality clinic within 2-5 days if your signs and symptoms have not resolved or worsen.     Patient can call our Referral Hotline at [phone] to make an appointment.      Please return here or go to the Emergency Department for any concerns or worsening of condition.   (Worsening vision, eye pain, sensitivity to light, increased eye discharge)

## 2025-01-09 NOTE — PROGRESS NOTES
This note was created by combination of typed  and M-Modal dictation.  Transcription errors may be present.   This note was also generated with the assistance of ambient listening technology. Verbal consent was obtained by the patient and accompanying visitor(s) for the recording of patient appointment to facilitate this note. I attest to having reviewed and edited the generated note for accuracy, though some syntax or spelling errors may persist. Please contact the author of this note for any clarification.    Assessment and Plan:   Assessment and Plan   Normal physical exam  Encounter for screening for HIV  Need for hepatitis C screening test  -return for early morning fasting labs  Encouraged to stop smoking and cut down alcohol  Notes decreasing libido and is inquiring about testosterone testing.  Will check labs but if abnormal would repeat in check pituitary axis and needs to work on cutting down alcohol and smoking 1st  -     CBC Auto Differential; Future; Expected date: 01/10/2025  -     Comprehensive Metabolic Panel; Future; Expected date: 01/10/2025  -     Lipid Panel; Future; Expected date: 01/10/2025  -     Hemoglobin A1C; Future; Expected date: 01/10/2025  -     TSH; Future; Expected date: 01/10/2025  -     HIV 1/2 Ag/Ab (4th Gen); Future; Expected date: 01/10/2025  -     Hepatitis C Antibody; Future; Expected date: 01/10/2025  -     CBC Without Differential; Future; Expected date: 01/09/2026  -     Comprehensive Metabolic Panel; Future; Expected date: 01/09/2026  -     Lipid Panel; Future; Expected date: 01/09/2026  -     Hemoglobin A1C; Future; Expected date: 01/09/2026  -     Testosterone; Future; Expected date: 01/10/2025    Smoker  -discussed effects of smoking including effects on libido, erectile dysfunction, reflux in addition to cardiovascular risk, pulmonary risk etc. has previously quit cold turkey.  Declines referral to smoking class.  We discussed nicotine patch and he is  interested he can try medium or high dose nicotine patch and wean down eventually    Alcohol use   Recommended to limit to no more than 2 drinks at a time, 14 in 1 week.  Discussed that alcohol use may contribute to complaints of reflux, low libido    Rhinitis medicamentosa  -notes recurrent sinus congestion.  Using Afrin for years.  History of broken nose multiple times.  Recommended stopping Afrin and trial of alternate such as Flonase.  Stopping smoking we will greatly help    Gastroesophageal reflux disease, unspecified whether esophagitis present  -taking Tums.  Denies dysphagia.  Can escalate to Pepcid.  Recommended increasing time between last meal and bedtime, also recommended cutting down on alcohol and stopping smoking    Medications Discontinued During This Encounter   Medication Reason    VENTOLIN HFA 90 mcg/actuation inhaler     moxifloxacin (VIGAMOX) 0.5 % ophthalmic solution        meds sent this encounter:         Follow Up:  Plan for physical exam 1 year if labs are normal  Future Appointments   Date Time Provider Department Center   [facility]   1/5/2026  8:00 AM LAB, [facility]   1/12/2026  8:40 AM Carosn Bradford MD [facility]            Subjective:   Subjective   Chief Complaint   Patient presents with    Advice Only     Wants to see ENT for nose, some difficulty breathing from broken nose    Annual Exam       HPI  Jayden is a 44 y.o. male.     Social History     Tobacco Use    Smoking status: Every Day    Smokeless tobacco: Never   Substance Use Topics    Alcohol use: Yes          Social History     Social History Narrative    Not on file       Patient Care Team:  No, Primary Doctor as PCP - General    Last appointment with this clinic was Visit date not found. Last visit with me Visit date not found   To summarize last visit and events leading up to today:  New to [facility]  Abnormal random glucose  smoker    Today's  visit:    History of Present Illness    SOCIAL HISTORY:  - Smoking: 3/4 to 1 pack per day for 15 years  - Alcohol: 3-6 drinks daily  - Occupation:  for 12 years    HPI:  Jayden reports nocturnal heartburn for years, awakening 4-5 times per week and requiring antacids to resume sleep. Acid reflux occurs when changing positions. Symptoms are absent during daytime and with physical activity. Jayden consumes dinner between 7 and 9 PM and retires between 10 PM and 12:30 AM, reporting moderate dinner portions. He keeps antacids at bedside for as-needed use.    Jayden also reports chronic unilateral nasal congestion, causing difficulty breathing at night, which he attributes to multiple nasal fractures (at least 5). He manages symptoms with Afrin nasal spray, used 2-4 times weekly for years.    Jayden denies daytime heartburn, chest pain, or dyspnea. He also denies any known ongoing health conditions, hypertension, cardiac issues, glycemic disorders, asthma, seasonal allergies, or migraines.    MEDICATIONS:  - Afrin, 2-4 times per week, nasal spray, for nasal congestion, used for years  - Tums, as needed (about 4-5 times per week), oral, for heartburn relief, used for years    FAMILY HISTORY:  - Father:  at age 39, cause not specified  - Paternal grandfather: Esophageal cancer  - Paternal grandmother: Cancer, type not specified  - Mother: Reported as healthy  - Two brothers: Both  at young age      ROS:  ENT: reports nasal congestion  Cardiovascular: no chest pain  Respiratory: reports difficulty breathing  Gastrointestinal: reports heartburn         ALLERGIES AND MEDICATIONS: updated and reviewed.  Medication List with Changes/Refills   Discontinued Medications    MOXIFLOXACIN (VIGAMOX) 0.5 % OPHTHALMIC SOLUTION    Place 1 drop into both eyes 3 (three) times daily.    VENTOLIN HFA 90 MCG/ACTUATION INHALER    2 puffs every 4 to 6 hours as needed.         Objective:   Objective   Physical Exam  "  Vitals:    01/10/25 1027   BP: 126/70   BP Location: Right arm   Patient Position: Sitting   Pulse: 74   Temp: 98 °F (36.7 °C)   TempSrc: Oral   SpO2: 97%   Weight: 82.5 kg (181 lb 12.3 oz)   Height: 5' 7" (1.702 m)    Body mass index is 28.47 kg/m².  Weight: 82.5 kg (181 lb 12.3 oz)   Height: 5' 7" (170.2 cm)     Physical Exam  Constitutional:       Appearance: Normal appearance. He is well-developed.   HENT:      Right Ear: Tympanic membrane and external ear normal.      Left Ear: Tympanic membrane and external ear normal.      Nose: Nose normal.   Eyes:      General: No scleral icterus.     Conjunctiva/sclera: Conjunctivae normal.   Neck:      Thyroid: No thyroid mass or thyromegaly.      Trachea: Trachea normal.   Cardiovascular:      Rate and Rhythm: Normal rate and regular rhythm.      Heart sounds: Normal heart sounds, S1 normal and S2 normal. No murmur heard.  Pulmonary:      Effort: Pulmonary effort is normal.      Breath sounds: Normal breath sounds.   Abdominal:      General: There is no distension.      Palpations: Abdomen is soft. There is no hepatomegaly, splenomegaly or mass.      Tenderness: There is no abdominal tenderness.   Musculoskeletal:         General: No deformity.      Right lower leg: No edema.      Left lower leg: No edema.   Lymphadenopathy:      Cervical: No cervical adenopathy.   Skin:     General: Skin is warm and dry.      Findings: No rash (on exposed skin).      Comments: On exposed skin   Neurological:      Mental Status: He is alert and oriented to person, place, and time.      Cranial Nerves: No cranial nerve deficit.      Sensory: No sensory deficit.      Deep Tendon Reflexes: Reflexes are normal and symmetric.   Psychiatric:         Speech: Speech normal.         Behavior: Behavior normal.         Thought Content: Thought content normal.         Judgment: Judgment normal.                "

## 2025-01-10 ENCOUNTER — OFFICE VISIT (OUTPATIENT)
Dept: FAMILY MEDICINE | Facility: CLINIC | Age: 45
End: 2025-01-10
Payer: COMMERCIAL

## 2025-01-10 VITALS
TEMPERATURE: 98 F | BODY MASS INDEX: 28.53 KG/M2 | SYSTOLIC BLOOD PRESSURE: 126 MMHG | DIASTOLIC BLOOD PRESSURE: 70 MMHG | HEART RATE: 74 BPM | OXYGEN SATURATION: 97 % | WEIGHT: 181.75 LBS | HEIGHT: 67 IN

## 2025-01-10 DIAGNOSIS — F17.200 SMOKER: ICD-10-CM

## 2025-01-10 DIAGNOSIS — Z11.59 NEED FOR HEPATITIS C SCREENING TEST: ICD-10-CM

## 2025-01-10 DIAGNOSIS — K21.9 GASTROESOPHAGEAL REFLUX DISEASE, UNSPECIFIED WHETHER ESOPHAGITIS PRESENT: ICD-10-CM

## 2025-01-10 DIAGNOSIS — Z00.00 NORMAL PHYSICAL EXAM: Primary | ICD-10-CM

## 2025-01-10 DIAGNOSIS — J31.0 RHINITIS MEDICAMENTOSA: ICD-10-CM

## 2025-01-10 DIAGNOSIS — T48.5X5A RHINITIS MEDICAMENTOSA: ICD-10-CM

## 2025-01-10 DIAGNOSIS — Z11.4 ENCOUNTER FOR SCREENING FOR HIV: ICD-10-CM

## 2025-01-10 PROCEDURE — 99999 PR PBB SHADOW E&M-EST. PATIENT-LVL III: CPT | Mod: PBBFAC,,, | Performed by: INTERNAL MEDICINE

## 2025-01-10 NOTE — PATIENT INSTRUCTIONS
Try flonase instead of Afrin    Escalate Tums to Pepcid    Work on stopping smoking  And cutting down alcohol.

## 2025-01-13 ENCOUNTER — LAB VISIT (OUTPATIENT)
Dept: LAB | Facility: HOSPITAL | Age: 45
End: 2025-01-13
Attending: INTERNAL MEDICINE
Payer: COMMERCIAL

## 2025-01-13 ENCOUNTER — PATIENT OUTREACH (OUTPATIENT)
Dept: ADMINISTRATIVE | Facility: HOSPITAL | Age: 45
End: 2025-01-13
Payer: COMMERCIAL

## 2025-01-13 DIAGNOSIS — Z00.00 NORMAL PHYSICAL EXAM: ICD-10-CM

## 2025-01-13 DIAGNOSIS — Z11.4 ENCOUNTER FOR SCREENING FOR HIV: ICD-10-CM

## 2025-01-13 DIAGNOSIS — Z11.59 NEED FOR HEPATITIS C SCREENING TEST: ICD-10-CM

## 2025-01-13 LAB
ALBUMIN SERPL BCP-MCNC: 4 G/DL (ref 3.5–5.2)
ALP SERPL-CCNC: 77 U/L (ref 40–150)
ALT SERPL W/O P-5'-P-CCNC: 42 U/L (ref 10–44)
ANION GAP SERPL CALC-SCNC: 9 MMOL/L (ref 8–16)
AST SERPL-CCNC: 35 U/L (ref 10–40)
BASOPHILS # BLD AUTO: 0.05 K/UL (ref 0–0.2)
BASOPHILS NFR BLD: 0.6 % (ref 0–1.9)
BILIRUB SERPL-MCNC: 0.3 MG/DL (ref 0.1–1)
BUN SERPL-MCNC: 9 MG/DL (ref 6–20)
CALCIUM SERPL-MCNC: 10 MG/DL (ref 8.7–10.5)
CHLORIDE SERPL-SCNC: 106 MMOL/L (ref 95–110)
CHOLEST SERPL-MCNC: 242 MG/DL (ref 120–199)
CHOLEST/HDLC SERPL: 6.4 {RATIO} (ref 2–5)
CO2 SERPL-SCNC: 27 MMOL/L (ref 23–29)
CREAT SERPL-MCNC: 1 MG/DL (ref 0.5–1.4)
DIFFERENTIAL METHOD BLD: ABNORMAL
EOSINOPHIL # BLD AUTO: 0.4 K/UL (ref 0–0.5)
EOSINOPHIL NFR BLD: 4.2 % (ref 0–8)
ERYTHROCYTE [DISTWIDTH] IN BLOOD BY AUTOMATED COUNT: 13.1 % (ref 11.5–14.5)
EST. GFR  (NO RACE VARIABLE): >60 ML/MIN/1.73 M^2
ESTIMATED AVG GLUCOSE: 111 MG/DL (ref 68–131)
GLUCOSE SERPL-MCNC: 95 MG/DL (ref 70–110)
HBA1C MFR BLD: 5.5 % (ref 4–5.6)
HCT VFR BLD AUTO: 50.8 % (ref 40–54)
HCV AB SERPL QL IA: NORMAL
HDLC SERPL-MCNC: 38 MG/DL (ref 40–75)
HDLC SERPL: 15.7 % (ref 20–50)
HGB BLD-MCNC: 15.9 G/DL (ref 14–18)
HIV 1+2 AB+HIV1 P24 AG SERPL QL IA: NORMAL
IMM GRANULOCYTES # BLD AUTO: 0.03 K/UL (ref 0–0.04)
IMM GRANULOCYTES NFR BLD AUTO: 0.3 % (ref 0–0.5)
LDLC SERPL CALC-MCNC: 164.2 MG/DL (ref 63–159)
LYMPHOCYTES # BLD AUTO: 3.4 K/UL (ref 1–4.8)
LYMPHOCYTES NFR BLD: 38.5 % (ref 18–48)
MCH RBC QN AUTO: 29.1 PG (ref 27–31)
MCHC RBC AUTO-ENTMCNC: 31.3 G/DL (ref 32–36)
MCV RBC AUTO: 93 FL (ref 82–98)
MONOCYTES # BLD AUTO: 1 K/UL (ref 0.3–1)
MONOCYTES NFR BLD: 11.2 % (ref 4–15)
NEUTROPHILS # BLD AUTO: 3.9 K/UL (ref 1.8–7.7)
NEUTROPHILS NFR BLD: 45.2 % (ref 38–73)
NONHDLC SERPL-MCNC: 204 MG/DL
NRBC BLD-RTO: 0 /100 WBC
PLATELET # BLD AUTO: 263 K/UL (ref 150–450)
PMV BLD AUTO: 10.2 FL (ref 9.2–12.9)
POTASSIUM SERPL-SCNC: 4.8 MMOL/L (ref 3.5–5.1)
PROT SERPL-MCNC: 8.4 G/DL (ref 6–8.4)
RBC # BLD AUTO: 5.46 M/UL (ref 4.6–6.2)
SODIUM SERPL-SCNC: 142 MMOL/L (ref 136–145)
TESTOST SERPL-MCNC: 641 NG/DL (ref 304–1227)
TRIGL SERPL-MCNC: 199 MG/DL (ref 30–150)
TSH SERPL DL<=0.005 MIU/L-ACNC: 1.1 UIU/ML (ref 0.4–4)
WBC # BLD AUTO: 8.72 K/UL (ref 3.9–12.7)

## 2025-01-13 PROCEDURE — 85025 COMPLETE CBC W/AUTO DIFF WBC: CPT | Performed by: INTERNAL MEDICINE

## 2025-01-13 PROCEDURE — 84443 ASSAY THYROID STIM HORMONE: CPT | Performed by: INTERNAL MEDICINE

## 2025-01-13 PROCEDURE — 36415 COLL VENOUS BLD VENIPUNCTURE: CPT | Mod: PO | Performed by: INTERNAL MEDICINE

## 2025-01-13 PROCEDURE — 86803 HEPATITIS C AB TEST: CPT | Performed by: INTERNAL MEDICINE

## 2025-01-13 PROCEDURE — 80053 COMPREHEN METABOLIC PANEL: CPT | Performed by: INTERNAL MEDICINE

## 2025-01-13 PROCEDURE — 87389 HIV-1 AG W/HIV-1&-2 AB AG IA: CPT | Performed by: INTERNAL MEDICINE

## 2025-01-13 PROCEDURE — 83036 HEMOGLOBIN GLYCOSYLATED A1C: CPT | Performed by: INTERNAL MEDICINE

## 2025-01-13 PROCEDURE — 80061 LIPID PANEL: CPT | Performed by: INTERNAL MEDICINE

## 2025-01-13 PROCEDURE — 84403 ASSAY OF TOTAL TESTOSTERONE: CPT | Performed by: INTERNAL MEDICINE

## 2025-01-28 ENCOUNTER — OFFICE VISIT (OUTPATIENT)
Dept: UROLOGY | Facility: CLINIC | Age: 45
End: 2025-01-28
Payer: COMMERCIAL

## 2025-01-28 VITALS
HEIGHT: 67 IN | SYSTOLIC BLOOD PRESSURE: 135 MMHG | BODY MASS INDEX: 28.55 KG/M2 | DIASTOLIC BLOOD PRESSURE: 86 MMHG | WEIGHT: 181.88 LBS | HEART RATE: 78 BPM

## 2025-01-28 DIAGNOSIS — N52.9 ERECTILE DYSFUNCTION, UNSPECIFIED ERECTILE DYSFUNCTION TYPE: ICD-10-CM

## 2025-01-28 DIAGNOSIS — N50.812 PAIN IN LEFT TESTICLE: Primary | ICD-10-CM

## 2025-01-28 LAB
BILIRUB SERPL-MCNC: NORMAL MG/DL
BLOOD URINE, POC: NORMAL
CLARITY, POC UA: NORMAL
COLOR, POC UA: NORMAL
GLUCOSE UR QL STRIP: NORMAL
KETONES UR QL STRIP: NORMAL
LEUKOCYTE ESTERASE URINE, POC: NORMAL
NITRITE, POC UA: NORMAL
PH, POC UA: 5.5
PROTEIN, POC: NORMAL
SPECIFIC GRAVITY, POC UA: 1.01
UROBILINOGEN, POC UA: 0.2

## 2025-01-28 PROCEDURE — 1159F MED LIST DOCD IN RCRD: CPT | Mod: CPTII,S$GLB,, | Performed by: NURSE PRACTITIONER

## 2025-01-28 PROCEDURE — 3075F SYST BP GE 130 - 139MM HG: CPT | Mod: CPTII,S$GLB,, | Performed by: NURSE PRACTITIONER

## 2025-01-28 PROCEDURE — 1160F RVW MEDS BY RX/DR IN RCRD: CPT | Mod: CPTII,S$GLB,, | Performed by: NURSE PRACTITIONER

## 2025-01-28 PROCEDURE — 3008F BODY MASS INDEX DOCD: CPT | Mod: CPTII,S$GLB,, | Performed by: NURSE PRACTITIONER

## 2025-01-28 PROCEDURE — 3079F DIAST BP 80-89 MM HG: CPT | Mod: CPTII,S$GLB,, | Performed by: NURSE PRACTITIONER

## 2025-01-28 PROCEDURE — 3044F HG A1C LEVEL LT 7.0%: CPT | Mod: CPTII,S$GLB,, | Performed by: NURSE PRACTITIONER

## 2025-01-28 PROCEDURE — 99999 PR PBB SHADOW E&M-EST. PATIENT-LVL III: CPT | Mod: PBBFAC,,, | Performed by: NURSE PRACTITIONER

## 2025-01-28 PROCEDURE — 81002 URINALYSIS NONAUTO W/O SCOPE: CPT | Mod: S$GLB,,, | Performed by: NURSE PRACTITIONER

## 2025-01-28 PROCEDURE — 99204 OFFICE O/P NEW MOD 45 MIN: CPT | Mod: S$GLB,,, | Performed by: NURSE PRACTITIONER

## 2025-01-28 RX ORDER — SILDENAFIL 100 MG/1
100 TABLET, FILM COATED ORAL DAILY PRN
Qty: 10 TABLET | Refills: 12 | Status: SHIPPED | OUTPATIENT
Start: 2025-01-28 | End: 2026-01-28

## 2025-01-28 RX ORDER — NAPROXEN 500 MG/1
500 TABLET ORAL 2 TIMES DAILY WITH MEALS
Qty: 30 TABLET | Refills: 1 | Status: SHIPPED | OUTPATIENT
Start: 2025-01-28 | End: 2025-02-27

## 2025-01-28 NOTE — PROGRESS NOTES
CHIEF COMPLAINT:    Jayden Pepper is a 44 y.o. male presents for Testicle Pain     HISTORY OF PRESENTING ILLINESS:    Jayden Pepper is a 44 y.o. male new to our urology. This is a new patient to for me. I personally reviewed their recent medical records as well as their outside medical, surgical, family, & social history.     He is here today due to left testicle pain without any know injury. States he remembers that he had an incident several years ago but it resolved.   This time it has been present for over a month. The discomfort can vary with intensity. Occurs at different times; even while in bed.   Pain can radiate to lower abdomen.   No taking can meds or OTC for it.   Wears boxers during the day; sleeps without.     Has noticed a swollen area on top of the left testicle; nothing on the right.     Also reports a recent change in his erections and libido.   At times he has lost his erection during intercourse. There was not pain/testicle pain that triggered it.   Testosterone level was 641.    No urinary issues.             REVIEW OF SYSTEMS:  Review of Systems   Constitutional: Negative.  Negative for chills and fever.   Eyes:  Negative for double vision.   Respiratory:  Negative for cough and shortness of breath.    Cardiovascular:  Negative for chest pain and palpitations.   Gastrointestinal:  Negative for abdominal pain, constipation, diarrhea, nausea and vomiting.   Genitourinary:  Negative for dysuria, flank pain, frequency, hematuria and urgency.        See HPI   Musculoskeletal:  Negative for falls.   Neurological:  Negative for dizziness and seizures.   Endo/Heme/Allergies:  Negative for polydipsia.         PATIENT HISTORY:    History reviewed. No pertinent past medical history.    Past Surgical History:   Procedure Laterality Date    FRACTURE SURGERY  2011    Leg    WRIST SURGERY Right        Family History   Problem Relation Name Age of Onset    Alcohol abuse Mother Shanna Duong      Alcohol abuse Father Murali Pepper     No Known Problems Brother      No Known Problems Brother      Early death Maternal Grandmother Sania Duong     Cancer Maternal Grandfather Ted Duong     Esophageal cancer Maternal Grandfather Ted Duong     No Known Problems Son      No Known Problems Son      No Known Problems Son      No Known Problems Daughter      No Known Problems Daughter      No Known Problems Daughter      No Known Problems Daughter         Social History     Socioeconomic History    Marital status:    Occupational History    Occupation: construction   Tobacco Use    Smoking status: Every Day     Current packs/day: 0.75     Average packs/day: 0.7 packs/day for 15.1 years (11.3 ttl pk-yrs)     Types: Cigarettes     Start date: 2010    Smokeless tobacco: Never   Substance and Sexual Activity    Alcohol use: Yes     Alcohol/week: 12.0 standard drinks of alcohol     Types: 12 Cans of beer per week     Comment: 3-6 drinks daily    Drug use: Not Currently    Sexual activity: Yes     Partners: Female     Birth control/protection: None     Social Drivers of Health     Financial Resource Strain: Low Risk  (1/4/2025)    Overall Financial Resource Strain (CARDIA)     Difficulty of Paying Living Expenses: Not hard at all   Food Insecurity: No Food Insecurity (1/4/2025)    Hunger Vital Sign     Worried About Running Out of Food in the Last Year: Never true     Ran Out of Food in the Last Year: Never true   Physical Activity: Insufficiently Active (1/4/2025)    Exercise Vital Sign     Days of Exercise per Week: 5 days     Minutes of Exercise per Session: 20 min   Stress: Stress Concern Present (1/4/2025)    [employer] - Occupational Stress Questionnaire     Feeling of Stress : To some extent   Housing Stability: Unknown (1/4/2025)    Housing Stability Vital Sign     Unable to Pay for Housing in the Last Year: No       Allergies:  Patient has no known  "allergies.    Medications:    Current Outpatient Medications:     naproxen (NAPROSYN) 500 MG tablet, Take 1 tablet (500 mg total) by mouth 2 (two) times daily with meals., Disp: 30 tablet, Rfl: 1    sildenafiL (VIAGRA) 100 MG tablet, Take 1 tablet (100 mg total) by mouth daily as needed for Erectile Dysfunction (take on an empty stomach 30-60 minutes before)., Disp: 10 tablet, Rfl: 12    PHYSICAL EXAMINATION:  Physical Exam  Vitals and nursing note reviewed.   Constitutional:       General: He is awake.      Appearance: Normal appearance.   HENT:      Head: Normocephalic.      Right Ear: External ear normal.      Left Ear: External ear normal.      Nose: Nose normal.   Cardiovascular:      Rate and Rhythm: Normal rate.   Pulmonary:      Effort: Pulmonary effort is normal. No respiratory distress.   Abdominal:      Tenderness: There is no abdominal tenderness. There is no right CVA tenderness or left CVA tenderness.   Genitourinary:     Penis: Normal and circumcised. No hypospadias, erythema or tenderness.       Testes:         Right: Mass present. Swelling not present.         Left: Tenderness present. Mass or swelling not present.      Epididymis:      Right: Not inflamed.      Left: Inflamed.   Musculoskeletal:         General: Normal range of motion.      Cervical back: Normal range of motion.   Skin:     General: Skin is warm and dry.   Neurological:      General: No focal deficit present.      Mental Status: He is alert and oriented to person, place, and time.   Psychiatric:         Mood and Affect: Mood normal.         Behavior: Behavior is cooperative.           LABS:      In office UA today was clear of active infection and blood.       No results found for: "PSA", "PSADIAG", "PSATOTAL", "PHIND"    Lab Results   Component Value Date    CREATININE 1.0 01/13/2025    EGFRNORACEVR >60.0 01/13/2025               IMPRESSION:    Encounter Diagnoses   Name Primary?    Pain in left testicle Yes    Erectile " dysfunction, unspecified erectile dysfunction type          Assessment:       1. Pain in left testicle    2. Erectile dysfunction, unspecified erectile dysfunction type        Plan:         I spent a total of 45 minutes on the day of the visit. This includes face to face time and non-face to face time preparing to see the patient (eg, review of tests), obtaining and/or reviewing separately obtained history, documenting clinical information in the electronic or other health record, independently interpreting results and communicating results to the patient/family/caregiver, or care coordinator  Reviewed the possible contributory factors for testicle pain.   Reassurance with today's in office UA; no indication of infection; more inflammation.   Reviewed management; including possible medical and surgical options; including home care.  - Rx for Naprosyn 500mg BID with food x 2-4 weeks  - good scrotal support.  We discussed his ED and the contributory factors.  Follow up with PCP for underlying medical conditions causing ED.   We discussed the physiological as well as his psychological aspects that contribute to ED.  Reviewed the 1st, line therapies for ED.  The benefits, expectations risks, se of the different therapies.  Encouraged to take on an empty stomach 30-60 minutes prior to interaction.   This can assist in getting back to his normal  Testosterone is excellent and not a concern  Rx Sildenafil 100mg PRN; take 1/2 tab initially.   Recommendations for PCP follow up visit; any need to go to the ER.    Recommended lifestyle modifications with a proper, healthy diet, good hydration but during the day. Reducing bladder irritants.   Benefits of regular exercise to improve overall health.   We discussed the reason, benefits, expectations as well as risks, possible side effects.   Any concerns then stop taking and let me know.

## 2025-04-01 ENCOUNTER — OFFICE VISIT (OUTPATIENT)
Dept: OTOLARYNGOLOGY | Facility: CLINIC | Age: 45
End: 2025-04-01
Payer: COMMERCIAL

## 2025-04-01 VITALS — BODY MASS INDEX: 28.31 KG/M2 | WEIGHT: 180.75 LBS

## 2025-04-01 DIAGNOSIS — J34.2 NASAL SEPTAL DEVIATION: Primary | ICD-10-CM

## 2025-04-01 DIAGNOSIS — J34.89 NASAL OBSTRUCTION: ICD-10-CM

## 2025-04-01 DIAGNOSIS — J32.9 SINUSITIS, UNSPECIFIED CHRONICITY, UNSPECIFIED LOCATION: ICD-10-CM

## 2025-04-01 DIAGNOSIS — J34.3 HYPERTROPHY OF BOTH INFERIOR NASAL TURBINATES: ICD-10-CM

## 2025-04-01 PROCEDURE — 3008F BODY MASS INDEX DOCD: CPT | Mod: CPTII,S$GLB,, | Performed by: OTOLARYNGOLOGY

## 2025-04-01 PROCEDURE — 3044F HG A1C LEVEL LT 7.0%: CPT | Mod: CPTII,S$GLB,, | Performed by: OTOLARYNGOLOGY

## 2025-04-01 PROCEDURE — 99999 PR PBB SHADOW E&M-EST. PATIENT-LVL III: CPT | Mod: PBBFAC,,, | Performed by: OTOLARYNGOLOGY

## 2025-04-01 PROCEDURE — 99204 OFFICE O/P NEW MOD 45 MIN: CPT | Mod: S$GLB,,, | Performed by: OTOLARYNGOLOGY

## 2025-04-01 RX ORDER — SULFAMETHOXAZOLE AND TRIMETHOPRIM 800; 160 MG/1; MG/1
1 TABLET ORAL 2 TIMES DAILY
Qty: 20 TABLET | Refills: 0 | Status: SHIPPED | OUTPATIENT
Start: 2025-04-01 | End: 2025-04-11

## 2025-04-01 RX ORDER — FLUTICASONE PROPIONATE 50 MCG
2 SPRAY, SUSPENSION (ML) NASAL DAILY
Qty: 16 G | Refills: 11 | Status: SHIPPED | OUTPATIENT
Start: 2025-04-01

## 2025-04-01 NOTE — PROGRESS NOTES
Subjective:       Patient ID: Jayden Pepper is a 45 y.o. male.    Chief Complaint: Breathing Problem and Ear Fullness    Jayden is here for nasal obstruction.   Length of symptoms: years.  Onset: Gradual  Symptoms occur Every day, worse at night may fluctuate.  He has snoring.   He has used Afrin but not continuously.   He has significant reduced smell which may have stemmed from nasal trauma years ago.    He has had nasal trauma in the past, cocaine use younger.     Patient validated questionnaires (if applicable):      %            No data to display                   No data to display                   No data to display                     Tobacco Use History[1]  Social History     Substance and Sexual Activity   Alcohol Use Yes    Alcohol/week: 12.0 standard drinks of alcohol    Types: 12 Cans of beer per week    Comment: 3-6 drinks daily          Objective:        Constitutional:   Vital signs are normal. He appears well-developed and well-nourished.     Head:  Normocephalic and atraumatic.     Ears:  Hearing normal to normal and whispered voice; external ear normal without scars, lesions, or masses; ear canal, tympanic membrane, and middle ear normal..     Nose:  Nose normal including turbinates, nasal mucosa, sinuses and nasal septum. Mucosal edema (moderate rhinitis changes) and septal deviation (severe L just beyond strut. Straight strut) present. Turbinate hypertrophy (mild).    Bilateral nasal crusting  Mucoid rhinorrhea in MT on right      Mouth/Throat  Oropharynx clear and moist without lesions or asymmetry.     Neck:  Neck normal without thyromegaly masses, asymmetry, normal tracheal structure, crepitus, and tenderness.         Tests / Results:  none    Assessment:       1. Nasal septal deviation    2. Nasal obstruction    3. Hypertrophy of both inferior nasal turbinates    4. Sinusitis, unspecified chronicity, unspecified location          Plan:       We discussed structural issues  contributing to nasal obstruction.  Possible underlying sinus disease as well.  We discussed regular use of nasal steroid spray.  Bactrim sent for at least what appears to be an acute if not chronic infection.    Recommend CT if persistent concerns.  RTC 6 weeks             [1]   Social History  Tobacco Use   Smoking Status Every Day    Current packs/day: 0.75    Average packs/day: 0.8 packs/day for 15.3 years (11.5 ttl pk-yrs)    Types: Cigarettes    Start date: 2010   Smokeless Tobacco Never

## 2025-04-01 NOTE — PATIENT INSTRUCTIONS
"Nasal Steroid Spray    You have been prescribed or instructed to take a nasal steroid spray. Examples of this medication include Flonase (fluticasone), Nasacort (triamcinolone), and Rhinocort (budeosnide). Some symptoms will experience relief within 1-2 days; however, it may take other side effects 2-3 weeks to begin to see improvement. This medication needs to be taken consistently to see results.    Use as directed, spraying 1-2 times in each nostril per day.     Helpful hints for maximizing medication into the nose  - Use the opposite hand to spray the nostril (example: right hand for left nostril). This will help avoid spraying the medication onto the septum (the area that divides the left and right nasal cavity.)  - Tilt the bottle so that it is facing at a slight angle up or straight back, but avoid pointing the bottle straight up while spraying.   - Sniff in while you are spraying.    Side effects:  Overall, this is a well tolerated medication with low side effects. The benefit of nasal steroids as opposed to oral steroids is that the nasal steroid works primarily in the nose.  Common side effects: headache, nasal dryness, minor nose bleeds,   Rare side effects: septal perforation, elevated eye pressures, dry eyes, change in smell, allergic reaction.  Notify your doctor if you have any concerns or experience these symptoms.     Nasal Saline  Nasal saline is an effective, natural way to keep the nose clean, moisturized, and open. It works very well in conjunction with other medications when needed.  This will help remove the allergens, debris, and mucous from your nose to help you breathe. It will also clear it in preparation for other nasal medications.    There are two types of saline use for the nose:    The first is nasal saline spray. This is a LOW volume, sometimes mist. You can purchase over the counter as "Ocean mist" or Saline Spray. This works well to moisturize the nose and help loosen small amount " "of debris. However, for more significant nasal issues, using a nasal saline lavage is more effective.   Nasal saline irrigations or lavage is a HIGH volume rinse. This involved using 8oz. of distilled water mixed with saline packet to rinse the entire nose and remove debris, pollen, dust, and infection. Many products exist for this, but I find the most effective for a good price is the "[employer] Sinus Rinse Kit."    You cannot overdose on saline washes, so it is safe to use 1-3 times per day as needed, long term.     To perform a saline irrigation, purchase an over the counter sinus irrigation kit such as the [employer] Sinus Rinse Kit. Use as directed on the box. You should use distilled water or water that was previously boiled and left to cool. If you wish, you may make your own solution. However, salt packets are available in the nasal section in your  drug store.     A rough estimate for making salt solution is:  8oz water  2 teaspoons salt (pickling, ignacio or Kosher salt)  1 teaspoon baking soda    After each use, rinse the bottle with small amount of rubbing alcohol and clean with soap.  Replace the irrigation bottle if it becomes visibly soiled or every few weeks.   "

## 2025-04-14 DIAGNOSIS — R52 PAIN: Primary | ICD-10-CM

## 2025-04-15 ENCOUNTER — APPOINTMENT (OUTPATIENT)
Dept: RADIOLOGY | Facility: HOSPITAL | Age: 45
End: 2025-04-15
Payer: COMMERCIAL

## 2025-04-15 ENCOUNTER — OFFICE VISIT (OUTPATIENT)
Dept: ORTHOPEDICS | Facility: CLINIC | Age: 45
End: 2025-04-15
Payer: COMMERCIAL

## 2025-04-15 VITALS — BODY MASS INDEX: 28.37 KG/M2 | HEIGHT: 67 IN | WEIGHT: 180.75 LBS

## 2025-04-15 DIAGNOSIS — M77.11 RIGHT LATERAL EPICONDYLITIS: Primary | ICD-10-CM

## 2025-04-15 DIAGNOSIS — R52 PAIN: ICD-10-CM

## 2025-04-15 PROCEDURE — 1159F MED LIST DOCD IN RCRD: CPT | Mod: CPTII,S$GLB,,

## 2025-04-15 PROCEDURE — 99999 PR PBB SHADOW E&M-EST. PATIENT-LVL III: CPT | Mod: PBBFAC,,,

## 2025-04-15 PROCEDURE — 3044F HG A1C LEVEL LT 7.0%: CPT | Mod: CPTII,S$GLB,,

## 2025-04-15 PROCEDURE — 1160F RVW MEDS BY RX/DR IN RCRD: CPT | Mod: CPTII,S$GLB,,

## 2025-04-15 PROCEDURE — 3008F BODY MASS INDEX DOCD: CPT | Mod: CPTII,S$GLB,,

## 2025-04-15 PROCEDURE — 73080 X-RAY EXAM OF ELBOW: CPT | Mod: 26,RT,, | Performed by: RADIOLOGY

## 2025-04-15 PROCEDURE — 73080 X-RAY EXAM OF ELBOW: CPT | Mod: TC,FY,PN,RT

## 2025-04-15 PROCEDURE — 99203 OFFICE O/P NEW LOW 30 MIN: CPT | Mod: S$GLB,,,

## 2025-04-15 RX ORDER — MELOXICAM 15 MG/1
15 TABLET ORAL DAILY
Qty: 30 TABLET | Refills: 1 | Status: SHIPPED | OUTPATIENT
Start: 2025-04-15

## 2025-04-15 NOTE — PROGRESS NOTES
New Orthopedic Patient: Elbow    SUBJECTIVE:      Chief Complaint:   Chief Complaint   Patient presents with    Elbow Pain     Right elbow pain        Referring Provider: N/A    Initial visit April 15, 2025 History of Present Illness:  Jayden Pepper is a 45 y.o. left hand dominant male who presents to clinic today with right elbow pain. Onset of symptoms was a few years ago, but has worsened over the last 2-3 months. Patient denies known DEEPTI or trauma to the area. Patient locates pain over (points to) the lateral aspect of the elbow radiating distally into the dorsal forearm. Symptoms consist of intermittent pain. The patient describes a 2 /10 sharp, sore, and aching pain. Symptoms are aggravated by activity, lifting, sleep. Symptoms are alleviated by rest. Attempted treatment(s) and/or interventions include activity modifications, rest,  elbow strap brace  with some improvement.  He has been wearing the brace directly over lateral epicondyle and feels this exacerbates symptoms.  He has not been taking any oral medications for this.  Denies numbness, tingling, radiation, swelling. Denies loss of  strength.      Patient denies any prior history of OT, corticosteroid injections, or surgeries to the elbow.    The patient works in construction    The patient denies any fevers, chills, N/V, D/C and presents for evaluation.    There were no vitals filed for this visit.    No past medical history on file.  Past Surgical History:   Procedure Laterality Date    FRACTURE SURGERY  2011    Leg    WRIST SURGERY Right      Review of patient's allergies indicates:  No Known Allergies  Social History     Social History Narrative    Not on file     Family History   Problem Relation Name Age of Onset    Alcohol abuse Mother Shanna Duong     Alcohol abuse Father Murali Pepper     No Known Problems Brother      No Known Problems Brother      Early death Maternal Grandmother Sania Duong     Cancer Maternal Grandfather  "Ted Duong     Esophageal cancer Maternal Grandfather Ted Duong     No Known Problems Son      No Known Problems Son      No Known Problems Son      No Known Problems Daughter      No Known Problems Daughter      No Known Problems Daughter      No Known Problems Daughter         Current Medications[1]    ROS  Review of Systems:  Constitutional: no fever or chills  Eyes: no visual changes  ENT: no nasal congestion or sore throat  Respiratory: no cough or shortness of breath  Cardiovascular: no chest pain  Gastrointestinal: no nausea or vomiting, tolerating diet  Musculoskeletal: pain and soreness    OBJECTIVE:      Vital Signs (Most Recent):  Vitals:    04/15/25 0858   Weight: 82 kg (180 lb 12.4 oz)   Height: 5' 7" (1.702 m)     Body mass index is 28.31 kg/m².    Physical Exam:  Constitutional: The patient appears well-developed and well-nourished. No distress.   Skin: No lesions appreciated  Head: Normocephalic and atraumatic.   Nose: Nose normal.   Ears: No deformities seen  Eyes: Conjunctivae and EOM are normal.   Neck: No tracheal deviation present.   Cardiovascular: Normal rate and intact distal pulses.    Pulmonary/Chest: Effort normal. No respiratory distress.   Abdominal: There is no guarding.   Neurological: The patient is alert.   Psychiatric: The patient has a normal mood and affect.     RIGHT ELBOW:         Observation/Inspection:    No evidence of lesions, erythema, swelling, or visible deformity.        Palpation:   Tenderness:  Mild tenderness to palpation along the ECRB insertion at the lateral epicondyle  Otherwise, no tenderness to palpation to medial epicondyle, radial head, olecranon, or surrounding soft tissues.        Range of Motion:  Full to flexion, extension, supination, and pronation without pain or locking.       Special Tests:  Tinel's Test - Cubital Tunnel (Elbow)  Negative  Resisted wrist extension (Tennis Elbow)  Positive  Resisted long finger extension  positive  Resisted wrist " flexion (Golfer's Elbow)   Negative         Strength:   Normal 5/5 strength in all tested muscle groups.         Neurovascular Exam:  Digits warm and well perfused, brisk capillary refill <3 seconds throughout  NVI motor/LTS to median, radial, and ulnar nerves, radial pulse 2+    Diagnostic Studies and other clinical records Review:      Imaging:   I independently viewed the patient's imaging as well as the radiology report.    3v elbow reveal no acute fracture or dislocation.  No significant degenerative changes.    ASSESSMENT/PLAN:    45 y.o. yo male with   Encounter Diagnosis   Name Primary?    Right lateral epicondylitis Yes        The patient and I had a thorough discussion today. We discussed the working diagnosis as well as several other potential alternative diagnoses. Treatment options were discussed, both conservative and surgical. Conservative treatment options would include things such as activity modifications, workplace modifications, a period of rest, oral vs topical OTC and prescription anti-inflammatory medications, physical/occupational therapy, splinting/bracing, and others.     Mobic 15 mg PO QD x 2 weeks then PRN. The patient was advised that NSAID-type medications have important potential side effects: gastrointestinal irritation, GI bleeding, cardiac effects and renal injuries. Take the medication with food and to stop and call the office for any GI upset, vomiting, abdominal pain or black/bloody stools. The patient expresses understanding of these issues and questions were answered.   Discussed home exercise program this physical therapy, patient defers at this time  Continue elbow strap brace.  Discussed proper wear and usage.  Follow up in six weeks or sooner if needed.  If still symptomatic at that time would likely recommend home exercise program versus formal physical therapy referral for dry needling consideration.  Call with any questions/concerns in the interim    The patient's  pathophysiology was explained in detail with reference to x-rays, models, other visual aids as appropriate. Treatment options were discussed in detail.  All questions addressed to the patient's satisfaction.     Ludmila Colon PA-C  [facility]          [1]   Current Outpatient Medications:     fluticasone propionate (FLONASE) 50 mcg/actuation nasal spray, 2 sprays (100 mcg total) by Each Nostril route once daily., Disp: 16 g, Rfl: 11    sildenafiL (VIAGRA) 100 MG tablet, Take 1 tablet (100 mg total) by mouth daily as needed for Erectile Dysfunction (take on an empty stomach 30-60 minutes before)., Disp: 10 tablet, Rfl: 12

## 2025-08-12 ENCOUNTER — OFFICE VISIT (OUTPATIENT)
Dept: OPHTHALMOLOGY | Facility: CLINIC | Age: 45
End: 2025-08-12
Payer: COMMERCIAL

## 2025-08-12 DIAGNOSIS — T15.02XA FOREIGN BODY OF LEFT CORNEA, INITIAL ENCOUNTER: Primary | ICD-10-CM

## 2025-08-12 PROCEDURE — 1159F MED LIST DOCD IN RCRD: CPT | Mod: CPTII,S$GLB,, | Performed by: OPHTHALMOLOGY

## 2025-08-12 PROCEDURE — 3044F HG A1C LEVEL LT 7.0%: CPT | Mod: CPTII,S$GLB,, | Performed by: OPHTHALMOLOGY

## 2025-08-12 PROCEDURE — 99999 PR PBB SHADOW E&M-EST. PATIENT-LVL III: CPT | Mod: PBBFAC,,, | Performed by: OPHTHALMOLOGY

## 2025-08-12 PROCEDURE — 65222 REMOVE FOREIGN BODY FROM EYE: CPT | Mod: LT,S$GLB,, | Performed by: OPHTHALMOLOGY

## 2025-08-12 PROCEDURE — 92004 COMPRE OPH EXAM NEW PT 1/>: CPT | Mod: 25,S$GLB,, | Performed by: OPHTHALMOLOGY

## 2025-08-12 PROCEDURE — 1160F RVW MEDS BY RX/DR IN RCRD: CPT | Mod: CPTII,S$GLB,, | Performed by: OPHTHALMOLOGY

## 2025-08-12 RX ORDER — MOXIFLOXACIN 5 MG/ML
1 SOLUTION/ DROPS OPHTHALMIC 4 TIMES DAILY
Qty: 3 ML | Refills: 3 | Status: SHIPPED | OUTPATIENT
Start: 2025-08-12

## 2025-08-19 ENCOUNTER — OFFICE VISIT (OUTPATIENT)
Dept: OTOLARYNGOLOGY | Facility: CLINIC | Age: 45
End: 2025-08-19
Payer: COMMERCIAL

## 2025-08-19 VITALS — WEIGHT: 201.06 LBS | BODY MASS INDEX: 31.56 KG/M2 | HEIGHT: 67 IN

## 2025-08-19 DIAGNOSIS — J34.3 HYPERTROPHY OF BOTH INFERIOR NASAL TURBINATES: ICD-10-CM

## 2025-08-19 DIAGNOSIS — J32.9 SINUSITIS, UNSPECIFIED CHRONICITY, UNSPECIFIED LOCATION: ICD-10-CM

## 2025-08-19 DIAGNOSIS — J34.2 NASAL SEPTAL DEVIATION: Primary | ICD-10-CM

## 2025-08-19 DIAGNOSIS — J34.89 NASAL OBSTRUCTION: ICD-10-CM

## 2025-08-19 PROCEDURE — 3008F BODY MASS INDEX DOCD: CPT | Mod: CPTII,S$GLB,, | Performed by: OTOLARYNGOLOGY

## 2025-08-19 PROCEDURE — 1159F MED LIST DOCD IN RCRD: CPT | Mod: CPTII,S$GLB,, | Performed by: OTOLARYNGOLOGY

## 2025-08-19 PROCEDURE — 3044F HG A1C LEVEL LT 7.0%: CPT | Mod: CPTII,S$GLB,, | Performed by: OTOLARYNGOLOGY

## 2025-08-19 PROCEDURE — 99999 PR PBB SHADOW E&M-EST. PATIENT-LVL IV: CPT | Mod: PBBFAC,,, | Performed by: OTOLARYNGOLOGY

## 2025-08-19 PROCEDURE — 99214 OFFICE O/P EST MOD 30 MIN: CPT | Mod: S$GLB,,, | Performed by: OTOLARYNGOLOGY

## 2025-08-19 PROCEDURE — 1160F RVW MEDS BY RX/DR IN RCRD: CPT | Mod: CPTII,S$GLB,, | Performed by: OTOLARYNGOLOGY

## 2025-08-27 ENCOUNTER — TELEPHONE (OUTPATIENT)
Dept: OTOLARYNGOLOGY | Facility: CLINIC | Age: 45
End: 2025-08-27
Payer: COMMERCIAL